# Patient Record
Sex: FEMALE | HISPANIC OR LATINO | Employment: UNEMPLOYED | URBAN - METROPOLITAN AREA
[De-identification: names, ages, dates, MRNs, and addresses within clinical notes are randomized per-mention and may not be internally consistent; named-entity substitution may affect disease eponyms.]

---

## 2023-04-15 ENCOUNTER — HOSPITAL ENCOUNTER (EMERGENCY)
Facility: CLINIC | Age: 43
Discharge: HOME | End: 2023-04-16
Attending: PHYSICIAN ASSISTANT | Admitting: PHYSICIAN ASSISTANT

## 2023-04-15 VITALS
OXYGEN SATURATION: 99 % | RESPIRATION RATE: 20 BRPM | TEMPERATURE: 97.8 F | DIASTOLIC BLOOD PRESSURE: 70 MMHG | SYSTOLIC BLOOD PRESSURE: 122 MMHG | HEART RATE: 89 BPM
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
## 2023-04-15 DIAGNOSIS — S01.01XA LACERATION OF SCALP, INITIAL ENCOUNTER: ICD-10-CM

## 2023-04-15 DIAGNOSIS — S09.90XA CLOSED HEAD INJURY, INITIAL ENCOUNTER: ICD-10-CM

## 2023-04-15 PROCEDURE — 99282 EMERGENCY DEPT VISIT SF MDM: CPT

## 2023-04-15 PROCEDURE — 12002 RPR S/N/AX/GEN/TRNK2.6-7.5CM: CPT

## 2023-04-15 RX ORDER — LIDOCAINE HYDROCHLORIDE AND EPINEPHRINE 10; 10 MG/ML; UG/ML
INJECTION, SOLUTION INFILTRATION; PERINEURAL
Status: DISCONTINUED
Start: 2023-04-15 | End: 2023-04-15 | Stop reason: HOSPADM

## 2023-04-15 ASSESSMENT — ACTIVITIES OF DAILY LIVING (ADL): ADLS_ACUITY_SCORE: 35

## 2023-04-16 NOTE — ED PROVIDER NOTES
History     Chief Complaint:  Fall     HPI   Mónica Maldonado is a 42 year old female who presents to the ED for evaluation after a fall with a head laceration. Patient reports that she was dancing around 2100 this evening when she had a mechanical fall forward, striking her head and sustaining a laceration to the right parietal scalp. No preceding symptoms. No LOC after the fall. No headache, vision changes. No neck pain, back pain, arm or leg pain. No abdominal pain. No nausea or vomiting. No blood thinners.    Independent Historian:   None - Patient Only    Review of External Notes:   None     ROS:  Review of Systems  ROS neg other than the symptoms noted above in the HPI.    Allergies:  No Known Allergies     Medications:    None    Past Medical History:    Reviewed, no pertinent past medical history    Social History:  Here with friends  PCP: No primary care provider on file.     Physical Exam     Patient Vitals for the past 24 hrs:   BP Temp Temp src Pulse Resp SpO2   04/15/23 2248 122/70 97.8  F (36.6  C) Temporal 89 20 99 %        Physical Exam  Constitutional: Pleasant. Cooperative.   Eyes: Pupils equally round and reactive  HENT: Mild scalp hematoma with overlying 4.5cm laceration to right parietal scalp. No bony step-off or crepitus. No facial bone tenderness or instability. No periorbital ecchymosis or Aaron signs. Oropharynx is normal with moist mucus membranes. No evidence for intraoral injury.  Cardiovascular: Regular rate and rhythm and without murmurs.  Respiratory: Normal respiratory effort, lungs are clear bilaterally.  Musculoskeletal: No midline cervical, thoracic, or lumbar tenderness. Normal painless ROM of the neck. No clavicular tenderness. No upper extremity tenderness. No lower extremity tenderness. Normal ROM of extremities. No tenderness with compression of the rib cage or pelvis.  Skin: No rashes. No lacerations or abrasions noted except scalp as above.  Neurologic: Cranial  nerves II-XII intact, normal cognition, no focal deficits. Alert and oriented x 3. GCS 15  Psychiatric: Normal affect.  Nursing notes and vital signs reviewed.    Emergency Department Course     Procedures     Narrative: Procedure: Laceration Repair        LACERATION:  A simple clean 4.5 cm laceration.      LOCATION:  Right parietal scalp      ANESTHESIA:  Local using 1% lidocaine with epi      PREPARATION:  Irrigation with Normal Saline      DEBRIDEMENT:  wound explored, no foreign body found      CLOSURE:  Wound was closed with 7 Staples    Emergency Department Course & Assessments:    Interventions:  Medications   lidocaine 1% with EPINEPHrine 1:100,000 1 %-1:[id number] injection (has no administration in time range)        Independent Interpretation (X-rays, CTs, rhythm strip):  None    Consultations/Discussion of Management or Tests:  None     Social Determinants of Health affecting care:   None    Disposition:  The patient was discharged to home.     Impression & Plan      [place] Head CT Rule  (calculator)  Background  Assesses need for head imaging in acute trauma  Only validated in adults with GCS 13-15 with witnessed LOC, amnesia to head injury or confusion  Data  42 year old  High Risk Criteria (major criteria)   Of 5 possible items  NEGATIVE    Moderate Risk Criteria (minor criteria)   Of 3 possible items  NEGATIVE    Interpretation  No indications for head imaging    Medical Decision Making:  Mónica Maldonado is a 42 year old female who presents to the ED for evaluation of a scalp laceration after a fall. This fall was mechanical in nature. No preceding symptoms. No pain elsewhere other than scalp. See HPI as above for additional details. Vitals and physical exam as above. No indication for head imaging per [place] criteria. No indication for other imaging based upon full exam. Wound repaired as above. Discussed wound precautions. Sutures out in 7 days. [place] patient was safe for discharge to home.  Discussed reasons to return. All questions answered. Patient discharged to home in stable condition.    Diagnosis:    ICD-10-CM    1. Fall, initial encounter  W19.XXXA       2. Closed head injury, initial encounter  S09.90XA       3. Laceration of scalp, initial encounter  S01.01XA            Discharge Medications:  New Prescriptions    No medications on file      This record was created at least in part using electronic voice recognition software, so please excuse any typographical errors.       Víctor Guy PA-C  04/15/23 1441

## 2023-04-16 NOTE — ED TRIAGE NOTES
"Patient arrives with a head laceration. Patient hit the top of her head on a beam and a [employer] party. No LOC. Laceration is about 5\" long to top of the head. Still bleeding. Gauze and Kerlix wrapped.    Last Tetanus about 3 years ago in [place].       "

## (undated) RX ORDER — BUPIVACAINE HYDROCHLORIDE 5 MG/ML
INJECTION, SOLUTION EPIDURAL; INTRACAUDAL
Status: DISPENSED
Start: 2023-04-15